# Patient Record
(demographics unavailable — no encounter records)

---

## 2025-01-16 NOTE — DISCUSSION/SUMMARY
[Please See Note in Chart and Documentation in Trial DB] : Please see note in chart and documentation in Trial DB. [FreeTextEntry3] : 58 yo female presents to [facility] for her 7th annual monitoring exam.   patient has no concerns at this time   PCP: Dr. 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 She then returned to her regular duties north of [facility]. She then went to landfill "several months later" and stayed for 2 weeks. She went back to regular duties from then on.    PMH/PSH: thyroidectomy 2011 ?multinodular goiter  , HTN--monitors her own BP, dyslipidemia ,   family history: mother age 82 w COPD  meds: see above allergies: NKDA tobacco: beginning age 13 , 1ppd  age 18-54; quit 2020 social hx   ROS: IAMQ reviewed PE: In trial DB  chest imaging: LDCT  ordered      Plan: -CBC, CMP, lipids, UA ordered -Imaging: chest imaging ordered today LDCT -Spirometry:  not done today due to limited supplies  -Influenza Vaccine declined -indications for colo, pap and mammo discussed  -RTC 1 year or sooner if

## 2025-01-16 NOTE — HEALTH RISK ASSESSMENT
[Patient reported mammogram was normal] : Patient reported mammogram was normal [Patient reported PAP Smear was normal] : Patient reported PAP Smear was normal [Patient reported colonoscopy was normal] : Patient reported colonoscopy was normal [MammogramDate] : 2024 [PapSmearDate] : 2023 [ColonoscopyDate] : 4/19 [ColonoscopyComments] : due back 2029

## 2025-01-31 NOTE — HISTORY OF PRESENT ILLNESS
[Former] : Former [TextBox_13] : Patient is scheduled for a annual  LDCT for lung cancer screening. Chart review performed to confirm eligibility for LDCT.    No documented personal or family history of lung cancer. No documented s/s of lung cancer. Pt is a former smoker (2020) with a 40 pack year hx. [PacksperYear] : 40

## 2025-05-22 NOTE — HISTORY OF PRESENT ILLNESS
[Normal Amount/Duration] :  normal amount and duration [Regular Cycle Intervals] : periods have been regular [Menarche Age: ____] : age at menarche was [unfilled] [Menopause Age: ____] : age at menopause was [unfilled] [Previously active] : previously active [Men] : men [Vaginal] : vaginal [No] : No [FreeTextEntry1] : 53yrs

## 2025-05-22 NOTE — PHYSICAL EXAM
[MA] : MA [Appropriately responsive] : appropriately responsive [Alert] : alert [No Acute Distress] : no acute distress [No Lymphadenopathy] : no lymphadenopathy [Regular Rate Rhythm] : regular rate rhythm [No Murmurs] : no murmurs [Clear to Auscultation B/L] : clear to auscultation bilaterally [Soft] : soft [Non-tender] : non-tender [Non-distended] : non-distended [No HSM] : No HSM [No Lesions] : no lesions [No Mass] : no mass [Oriented x3] : oriented x3 [Examination Of The Breasts] : a normal appearance [No Masses] : no breast masses were palpable [Labia Majora] : normal [Labia Minora] : normal [Discharge] : discharge [Moderate] : moderate [White] : white [Thin] : thin [Normal] : normal [Uterine Adnexae] : normal [FreeTextEntry2] : redd salinas [Foul Smelling] : not foul smelling

## 2025-05-22 NOTE — PLAN
[FreeTextEntry1] : patient c/o left side pelvic pain for 2weeks.on palpation on her left side behind skin small bumps felt ,tender. Patient advised to see her primary for further evaluation

## 2025-07-01 NOTE — DATA REVIEWED
[FreeTextEntry1] : BREAST PATH/RAD REVIEW. 1/6/22 BL SC MG (TC 13.7%): birads 1. Almost entirely fatty.  4/6/23 BL SC MG (TC 12.7%): birads 1. Almost entirely fatty.  5/8/25 BL Dx MG/R Dx US (TC 11.9%): birads 4C. Almost entirely fatty. R 1N90.9 cm (palp) irregular mass. R axilla w/ normal appearing LN.  5/22/25 Right [1N9] core bx: Fat necrosis. Discordant. (Rec surgical c/s and excision). [ribbon clip]

## 2025-07-01 NOTE — HISTORY OF PRESENT ILLNESS
[FreeTextEntry1] : RADHA PRIEST is a 60 y/o F who presents for evaluation of right breast 1N9 bx-proven fat necrosis that is radiologically discordant.  Referred by: Breast .  PCP: Dr. Catie Taylor.  [employer].   Detected a right breast lump 1-2 months on self-exam before she underwent diagnostic imaging in 2025 through her PCP.  Area was always asymptomatic. No nipple discharge or skin changes.  no history of trauma.    PMHx: HTN, Hypothyroidism, HLD, uterine fibroids.  PSHx: Abdominoplasty, Appendectomy at age 7, total thyroidectomy . Lap CCY.  Meds: amlodipine 2.5 mg, atorvastatin 10 mg, levothyroxine 125 mcg, weekend 112 mcg, vitamin B12, Vitamin D.  NKDA Family Hx: Breast Cancer (Maternal grandmother diagnosed at age 77-78), Cervical cancer (sister diagnosed in her 40s), skin cancer (sister), bladder and prostate cancer (father).  GYN: , menarche age 9, Menopause .  No h/o hormone replacement, OCP use for 3 months. Bra size: G cup.  Occupation: Retired .  Social: Former Smoking: Smoked for 45-47 years, quit in 2020.     ETOH: Socially.

## 2025-07-01 NOTE — ASSESSMENT
[FreeTextEntry1] : RADHA PRIEST is a 60 y/o F who presents for evaluation of right breast 1N9 bx-proven fat necrosis that is radiologically discordant.  symptomatic macromastia.    We discussed her right biopsy findings of fat necrosis which were deemed discordant and therefore excision is recommended for definitive diagnosis and removal of entire lesion including a rim of normal tissue.   We discussed risks and benefits of lumpectomy.   Risks, including but not limited to bleeding, infection, altered cosmesis, numbness, lymphedema, re-excision, and possible additional surgery.   Will need wide lumpectomy.  will refer to plastics for oncoplastic symmetry procedure.    Surgical plan:  Right estela-localized wide lumpectomy   with plastics symmetry reduction mastopexy procedure Tentative surgical date:  TBD  - Referred to plastics  - PST, Estela-loc. Periop instructions were provided. - RTO 2 weeks for final surgical plan.

## 2025-07-01 NOTE — PHYSICAL EXAM
[Normocephalic] : normocephalic [Atraumatic] : atraumatic [Supple] : supple [No Supraclavicular Adenopathy] : no supraclavicular adenopathy [No Cervical Adenopathy] : no cervical adenopathy [Examined in the supine and seated position] : examined in the supine and seated position [Symmetrical] : symmetrical [Bra Size: ___] : Bra Size: [unfilled] [No Axillary Lymphadenopathy] : no left axillary lymphadenopathy [Full ROM] : full range of motion [No Rashes] : no rashes [No Ulceration] : no ulceration [de-identified] : Normal respiratory effort  [de-identified] : bilateral macromastia causing significant kyphosis and shoulder indentations.  no palpable masses in either breast.  no clinical lymphadenopathy

## 2025-07-01 NOTE — CONSULT LETTER
[Dear  ___] : Dear  [unfilled], [Consult Letter:] : I had the pleasure of evaluating your patient, [unfilled]. [Please see my note below.] : Please see my note below. [Consult Closing:] : Thank you very much for allowing me to participate in the care of this patient.  If you have any questions, please do not hesitate to contact me. [Sincerely,] : Sincerely, [FreeTextEntry3] : Sylvia A. Reyes, M.D., MBS, FACS Breast Surgery Division of Surgical Oncology [address] [phone]

## 2025-07-03 NOTE — ASSESSMENT
[FreeTextEntry1] : RADHA PRIEST is a 59 year old F who presents for consultation regarding reconstructive options after planned right wide lumpectomy, interested in symmetry reduction mastopexy procedure  We discussed reconstruction after lumpectomy. Risks, benefits, and alternatives to surgery were reviewed and routine irene-operative course described, which include but are not limited to infection, bleeding, recurrence, seroma, asymmetry, deformity, scarring, paresthesia, wound dehiscence, etc. Patient expressed understanding and wishes to proceed.  Exam findings discussed with the patient - large, heavy, pendulous breasts with +shoulder grooving, R>L. She may benefit from bilateral breast reduction mammaplasty with possible free nipple graft/liposuction.  We discussed recommended procedure including alternatives, indications, procedure technique, benefits, perioperative course including but not limited inverted T scar, risks and potential complications which include but are not limited to infection, bleeding, recurrence, fat necrosis, seroma, hematoma, scarring, wound dehiscence, asymmetry, deformity, scarring, paraesthesia, wound dehiscence, and nipple loss. All patient questions were answered. She remains agreeable to proceed as discussed.  The patient will be scheduled for an outpatient procedure at the earliest mutually convenient time.  -My office will coordinate with the patient and Dr.Reyes  -Surgical paperwork submitted

## 2025-07-03 NOTE — PHYSICAL EXAM
[TextEntry] : Bilateral breasts:  Measurement in cm: Sternal Notch to Nipple: R - 36.5 L - 35 IMF to Nipple: R - 16 L - 15.5  Base Width: R - 18.5 L - 18.5  Prior biopsy site noted on the right breast 1 o'clock 9 cm from the nipple  ptotic breasts  R>L Photos taken today  Constitutional: NAD, well-nourished HENT: Normocephalic, atraumatic, PERRL, non-icteric sclerae, neck supple, trachea midline PULM: LSCTAB, no wheezing or rhonchi CV: RRR, no murmur, rubs, or gallops Abd: Soft, NT, ND, +BS, no palpable masses or bulge MSK: CHO Skin: No rash or lesions noted Neuro: A&O x 3, no FND Psych: Mood is appropriate

## 2025-07-03 NOTE — HISTORY OF PRESENT ILLNESS
[FreeTextEntry1] : RADHA PRIEST is a 59 year old F who presents for consultation regarding reconstructive options after planned right wide lumpectomy, interested in symmetry reduction mastopexy procedure  Right breast 1N9 bx-proven fat necrosis that is radiologically discordant. Detected a right breast lump 1-2 months on self-exam before she underwent diagnostic imaging in 5/2025 through her PCP. Area was always asymptomatic. No nipple discharge or skin changes. no history of trauma. Denies any prior breast surgeries  C/o back and neck pain the last 4 years due to her breasts Otherwise, denies cp, sob, subjective fever, chills, headache, cough, myalgia, malaise, abd pain, n/v/d, decreased appetite, and generalized weakness.   Current bra size: 40G  Most recent mammogram: May 2025 PMHx: HTN, Hypothyroidism, HLD, uterine fibroids PSHx: Abdominoplasty 2011, Appendectomy at age 7, total thyroidectomy 2002. Lap CCY. Family hx: Breast Cancer (Maternal grandmother diagnosed at age 77-78), Cervical cancer (sister diagnosed in her 40s), skin cancer (sister), bladder and prostate cancer (father). Allergies: NKDA  Current meds: amlodipine, atorvastatin, levothyroxine, weekend, vitamin B12, Vitamin D. Social hx: retired , [employer], smoked for 47 years, quit in 2020

## 2025-07-03 NOTE — CONSULT LETTER
[Dear  ___] : Dear  [unfilled], [Consult Letter:] : I had the pleasure of evaluating your patient, [unfilled]. [Please see my note below.] : Please see my note below. [Consult Closing:] : Thank you very much for allowing me to participate in the care of this patient.  If you have any questions, please do not hesitate to contact me. [Sincerely,] : Sincerely, [FreeTextEntry3] : Dr.Victor Krishna

## 2025-07-03 NOTE — ADDENDUM
[FreeTextEntry1] :  This note was authored by Madyson Garza working as a scribe for Dr.Victor Clemente. I, Dr. Orestes Clemente have reviewed the content of this note and confirm it is true and accurate. I personally performed the history and physical examination and made all the decisions 07/03/2025